# Patient Record
Sex: FEMALE | Race: BLACK OR AFRICAN AMERICAN | Employment: FULL TIME | URBAN - METROPOLITAN AREA
[De-identification: names, ages, dates, MRNs, and addresses within clinical notes are randomized per-mention and may not be internally consistent; named-entity substitution may affect disease eponyms.]

---

## 2023-09-21 ENCOUNTER — APPOINTMENT (OUTPATIENT)
Dept: GENERAL RADIOLOGY | Age: 23
End: 2023-09-21
Attending: EMERGENCY MEDICINE
Payer: COMMERCIAL

## 2023-09-21 ENCOUNTER — HOSPITAL ENCOUNTER (OUTPATIENT)
Age: 23
Discharge: HOME | End: 2023-09-21
Attending: EMERGENCY MEDICINE
Payer: COMMERCIAL

## 2023-09-21 VITALS
DIASTOLIC BLOOD PRESSURE: 83 MMHG | RESPIRATION RATE: 19 BRPM | SYSTOLIC BLOOD PRESSURE: 149 MMHG | HEART RATE: 99 BPM | OXYGEN SATURATION: 100 % | TEMPERATURE: 98 F

## 2023-09-21 DIAGNOSIS — S92.902A CLOSED FRACTURE OF LEFT FOOT, INITIAL ENCOUNTER: Primary | ICD-10-CM

## 2023-09-21 PROCEDURE — 99204 OFFICE O/P NEW MOD 45 MIN: CPT

## 2023-09-21 PROCEDURE — 28470 CLTX METATARSAL FX WO MNP EA: CPT

## 2023-09-21 PROCEDURE — 73610 X-RAY EXAM OF ANKLE: CPT | Performed by: EMERGENCY MEDICINE

## 2023-09-21 PROCEDURE — 73630 X-RAY EXAM OF FOOT: CPT | Performed by: EMERGENCY MEDICINE

## 2023-09-21 NOTE — ED INITIAL ASSESSMENT (HPI)
Patient stepped backwards on a step and felt a crack on Sunday.    Patient with left ankle and left foot pains   Hx of clubbed feet, chronic pain

## 2023-09-21 NOTE — DISCHARGE INSTRUCTIONS
Crutch walking only. Leave splint on until follow up with podiatry in 1-2 days.    Ice, elevate, ibuprofen for pain

## 2023-09-27 ENCOUNTER — OFFICE VISIT (OUTPATIENT)
Facility: LOCATION | Age: 23
End: 2023-09-27

## 2023-09-27 DIAGNOSIS — M79.672 LEFT FOOT PAIN: ICD-10-CM

## 2023-09-27 DIAGNOSIS — Z87.768 HISTORY OF CLUBFOOT: ICD-10-CM

## 2023-09-27 DIAGNOSIS — Q72.899 BRACHYMETATARSIA OF FOURTH METATARSAL BONE: ICD-10-CM

## 2023-09-27 DIAGNOSIS — T14.8XXA CONTUSION OF BONE: Primary | ICD-10-CM

## 2023-09-27 PROCEDURE — 99203 OFFICE O/P NEW LOW 30 MIN: CPT | Performed by: PODIATRIST

## 2023-09-27 NOTE — PROGRESS NOTES
[facility]  Progress Note    Jenn Bausrto is a 24year old female. Patient presents with:  New Patient: Patient was running up the stairs to grab something and missed a step on 9/17/23. Patient seen in  9/21/23, showing two fracture. 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 HPI:     Patient is a very pleasant 16-year-old female is coming to clinic today accompanied by her mother with complaints of a left foot injury. 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  She states that she was running up the stairs to grab something and missed a step on 9/17/2023. She went to the urgent care on 9/21/2023 and was told that she had 2 fractures. She was given a splint and crutches and told to follow-up with us. Currently rates her pain 1-2/10 today. She denies noticing any swelling or bruising to the area. She is denying any other concerns at this time is here for further evaluation and care. Allergies: Patient has no known allergies. No current outpatient medications on file. No past medical history on file. No past surgical history on file. No family history on file. Social History    Socioeconomic History      Marital status: Single          REVIEW OF SYSTEMS:     10 point ROS completed and was negative, except for pertinent positive and negatives stated in subjective. EXAM:   Left lower extremity focused exam:  GENERAL: well developed, well nourished, in no apparent distress  EXTREMITIES:   1. Integument: No open wounds. No color changes. Normal skin temperature and turgor  2. Vascular: Dorsalis pedis 2/4 bilateral and posterior tibial pulses 2/4 bilateral, capillary refill normal.  3. Neurological: Gross sensation intact via light touch bilaterally. Normal sharp/dull sensation   4.  Musculoskeletal: Brachymetatarsia deformity noted to fourth ray, left, varus deformity consistent with history of clubfoot, left. Mild pain to shaft of fourth metatarsal on palpation today. No pain with palpation to remainder of metatarsals of the left foot. ASSESSMENT AND PLAN:   Diagnoses and all orders for this visit:    Contusion of bone    Left foot pain    History of clubfoot    Brachymetatarsia of fourth metatarsal bone        Plan:   -Patient was seen and evaluated today in clinic.    -Independently reviewed patient's previous radiographs. Area of question for fourth metatarsal fracture looks more so overlap from the third metatarsal.    -Discussed both radiographic and clinical findings with patient today. Because her pain is 1/10 today with minimal pain on palpation, I told patient that she most likely sustained a contusion to the bone when falling.    -Patient was provided with a short cam boot today. She will be weightbearing as tolerated to the left lower extremity in her cam boot. Advised to try and put more weight on her heel.    -Recommend staying off her left foot is much as possible, icing, and elevating    -Ace bandage given to patient today.    -Provided patient with a work note today.    -We can also discuss accommodative brace versus orthotics for patient's left foot deformity at next visit.    -The patient indicates understanding of these issues and agrees to the plan. Time spent reviewing pertinent information from patient's chart, reviewing any pertinent imaging, obtaining history and physical exam, and discussing and mutually agreeing on a treatment plan: 35 minutes    RTC 2 weeks for new x-rays    Batsheva Jackson    9/27/2023    Dragon speech recognition software was used to prepare this note. Errors in word recognition may occur. Please contact me with any questions/concerns with this note.

## 2023-10-11 ENCOUNTER — HOSPITAL ENCOUNTER (OUTPATIENT)
Dept: GENERAL RADIOLOGY | Age: 23
Discharge: HOME | End: 2023-10-11
Attending: PODIATRIST
Payer: COMMERCIAL

## 2023-10-11 ENCOUNTER — OFFICE VISIT (OUTPATIENT)
Facility: LOCATION | Age: 23
End: 2023-10-11
Payer: COMMERCIAL

## 2023-10-11 DIAGNOSIS — Q72.899 BRACHYMETATARSIA OF FOURTH METATARSAL BONE: ICD-10-CM

## 2023-10-11 DIAGNOSIS — T14.8XXA CONTUSION OF BONE: Primary | ICD-10-CM

## 2023-10-11 DIAGNOSIS — M79.672 LEFT FOOT PAIN: ICD-10-CM

## 2023-10-11 DIAGNOSIS — R26.81 GAIT INSTABILITY: ICD-10-CM

## 2023-10-11 DIAGNOSIS — T14.8XXA CONTUSION OF BONE: ICD-10-CM

## 2023-10-11 DIAGNOSIS — Z87.768 HISTORY OF CLUBFOOT: ICD-10-CM

## 2023-10-11 PROCEDURE — 99213 OFFICE O/P EST LOW 20 MIN: CPT | Performed by: PODIATRIST

## 2023-10-11 PROCEDURE — 73630 X-RAY EXAM OF FOOT: CPT | Performed by: PODIATRIST

## 2023-10-11 NOTE — PROGRESS NOTES
[facility]  Progress Note    Clotilde Bruno is a 24year old female. Patient presents with: Foot Pain: Patient is here to follow up on left foot pain. She still has some discomfort in the ankle and lateral side of the left foot. She has been non weightbearing using crutches. As attempted to put light pressure on foot, with no pain. Rates pain 2/10, denies any numbness or tingling. Has throbbing at bedtime. HPI:     Patient is a pleasant 48-year-old female who is returning to clinic today accompanied by her mother for recheck of left foot. Patient states that she is doing well overall. She has noticed improvements. She has been in a cam boot. She does have crutches today. She states that the pain is improved, but she is more so nervous with starting to put weight on it. She does relate some discomfort/stiffness in her ankle joint. She does admit to putting some light weight on her foot with no pain. Rates the pain today 2/10. She does relate some throbbing at bedtime, but does admit to wearing her cam boot while she sleeps. Denies any recent injuries or other complaints at this time is here today for further evaluation and care. Denies recent nausea, vomiting, fever, chills. Allergies: Patient has no known allergies. No current outpatient medications on file. No past medical history on file. No past surgical history on file. No family history on file. Social History    Socioeconomic History      Marital status: Single          REVIEW OF SYSTEMS:     10 point ROS completed and was negative, except for pertinent positive and negatives stated in subjective. EXAM:   Left lower extremity focused exam:  GENERAL: well developed, well nourished, in no apparent distress  EXTREMITIES:              1. Integument: No open wounds. No color changes. Normal skin temperature and turgor  2.  Vascular: Dorsalis pedis 2/4 bilateral and posterior tibial pulses 2/4 bilateral, capillary refill normal.  3. Neurological: Gross sensation intact via light touch bilaterally. Normal sharp/dull sensation              4. Musculoskeletal: Brachymetatarsia deformity noted to fourth ray, left, varus deformity consistent with history of clubfoot, left. No pain to shaft of fourth metatarsal on palpation today. No pain with palpation to remainder of metatarsals of the left foot. Decreased active and passive range of motion of left ankle. Some pain with palpation to anterior ankle gutter      ASSESSMENT AND PLAN:   Diagnoses and all orders for this visit:    Contusion of bone  -     XR FOOT WEIGHTBEARING (3 VIEWS), LEFT (CPT=73630); Future    Left foot pain    History of clubfoot    Brachymetatarsia of fourth metatarsal bone    Gait instability        Plan:   -Patient was seen and evaluated today in clinic.    -Updated radiographs were obtained the left foot today independently reviewed, showing no current signs of fractures or other osseous abnormalities other than brachymetatarsia of the fourth metatarsal    -Discussed both radiographic and clinical findings with patient today. Patient is much improved and has no pain on clinical exam today. -Recommend transitioning out of cam boot at this time and into supportive shoe gear. Patient is hesitant and does not want to regress when leaving the cam boot. -I did recommend formal physical therapy to patient today to help assist in transition into supportive shoe gear. Patient is agreeable to this and referral was placed today.      -Provided patient with ankle brace today. She is to be weightbearing as tolerated in supportive shoe gear. I did discuss with patient that while progressing through physical therapy, my goal is for her to lose the ankle brace by the end of therapy.    -Continue resting, icing, and elevating as needed    -The patient indicates understanding of these issues and agrees to the plan.     Time spent reviewing pertinent information from patient's chart, reviewing any pertinent imaging, obtaining history and physical exam, and discussing and mutually agreeing on a treatment plan: 20 minutes    RTC 4 weeks      AUNDREA Hernandez [facility]        10/11/2023    Dragon speech recognition software was used to prepare this note. Errors in word recognition may occur. Please contact me with any questions/concerns with this note.

## 2023-11-09 ENCOUNTER — TELEPHONE (OUTPATIENT)
Facility: LOCATION | Age: 23
End: 2023-11-09

## 2023-11-09 NOTE — TELEPHONE ENCOUNTER
LMTCB    If patient calls back can you ask what [facility] location she would like so that we can fax order

## 2023-11-29 ENCOUNTER — OFFICE VISIT (OUTPATIENT)
Facility: LOCATION | Age: 23
End: 2023-11-29
Payer: COMMERCIAL

## 2023-11-29 DIAGNOSIS — M25.572 CHRONIC ANKLE PAIN, BILATERAL: ICD-10-CM

## 2023-11-29 DIAGNOSIS — R26.81 GAIT INSTABILITY: ICD-10-CM

## 2023-11-29 DIAGNOSIS — M19.272 OTHER SECONDARY OSTEOARTHRITIS OF LEFT ANKLE: Primary | ICD-10-CM

## 2023-11-29 DIAGNOSIS — Z87.768 HISTORY OF CLUBFOOT: ICD-10-CM

## 2023-11-29 DIAGNOSIS — G89.29 CHRONIC ANKLE PAIN, BILATERAL: ICD-10-CM

## 2023-11-29 DIAGNOSIS — M25.571 CHRONIC ANKLE PAIN, BILATERAL: ICD-10-CM

## 2023-11-29 DIAGNOSIS — Q72.899 BRACHYMETATARSIA OF FOURTH METATARSAL BONE: ICD-10-CM

## 2023-11-29 PROCEDURE — 99213 OFFICE O/P EST LOW 20 MIN: CPT | Performed by: PODIATRIST

## 2023-11-29 RX ORDER — MELOXICAM 7.5 MG/1
7.5 TABLET ORAL DAILY
Qty: 30 TABLET | Refills: 0 | Status: SHIPPED | OUTPATIENT
Start: 2023-11-29 | End: 2023-12-29

## 2023-11-29 NOTE — PROGRESS NOTES
[facility]  Progress Note    Bhumi Dc is a 24year old female. Chief Complaint   Patient presents with    Ankle Pain     Left ankle pain, patient has started physical therapy, 4 sessions in, does not notice improvement yet. She is wearing ankle brace and states that she would not be able to work without it. Rates pain 8/10 at the end of the day. She denies any numbness or tingling, fever or chills. HPI:     Patient is a pleasant 59-year-old female who is returning to clinic today accompanied by her mother for recheck of left foot and ankle. Patient states that she continues to do well overall. She does relate continued pain in her left ankle. She does have a history of bilateral clubfoot (left worse than right) with several surgeries to the left lower extremity as a kid. She does relate consistent pain in both ankles, but her left is the worst.  She is in formal physical therapy currently and has had 4 sessions. She unfortunately has not noticed any difference yet. She continues to utilize her lace up ankle brace of the left lower extremity, and a compression ankle sleeve to the right ankle. She does state that she has some relief utilizing the ankle braces. She states the pain can be as worse as 8/10 at the end of her day. Denies recent injuries. No other concerns today and is here for further evaluation and care. Denies recent nausea, vomiting, fever, chills. Allergies: Patient has no known allergies. No current outpatient medications on file. No past medical history on file. No past surgical history on file. No family history on file. Social History     Socioeconomic History    Marital status: Single           REVIEW OF SYSTEMS:     10 point ROS completed and was negative, except for pertinent positive and negatives stated in subjective.        EXAM:     GENERAL: well developed, well nourished, in no apparent distress  EXTREMITIES:              1. Integument: No open wounds. No color changes. Normal skin temperature and turgor  2. Vascular: Dorsalis pedis 2/4 bilateral and posterior tibial pulses 2/4 bilateral, capillary refill normal.  3. Neurological: Gross sensation intact via light touch bilaterally. Normal sharp/dull sensation              4. Musculoskeletal: Brachymetatarsia deformity noted to fourth ray, left, varus deformity consistent with history of clubfoot, left. No pain to shaft of fourth metatarsal on palpation today. No pain with palpation to remainder of metatarsals of the left foot. Decreased active and passive range of motion of bilateral ankles (left worse than right). Pain with palpation to lateral, anterior, and medial ankle gutters of bilateral lower extremities. ASSESSMENT AND PLAN:   Diagnoses and all orders for this visit:    Other secondary osteoarthritis of left ankle  -     DME - EXTERNAL     History of clubfoot  -     DME - EXTERNAL     Brachymetatarsia of fourth metatarsal bone    Gait instability  -     DME - EXTERNAL     Chronic ankle pain, bilateral  -     DME - EXTERNAL         Plan:   -Patient was seen and evaluated today in clinic.    -Patient does have concerns of continued ankle pain to bilateral lower extremities (left worse than right). She has no concerns in regards to her previous left midfoot pain.    -Discussed with patient that the pain that she is having in her ankles is most likely due to degenerative changes within the ankle joint secondary to her clubfoot deformities. Patient does have a history of several lower extremity surgeries to the left lower extremity. Previous radiographs do show concerns of joint space narrowing within the left ankle. -Patient does not have updated radiographs of the right ankle. I will order those today and patient can get them anytime before her next appointment and we will discuss then.    -Patient is currently in formal physical therapy.   I did advise patient continuing to go if she is receiving benefit from going.    -Discussed other options with patient today.    -Patient does get relief utilizing her lace up ankle brace to the left lower extremity, as well as the compression sleeve to the right lower extremity. I did explain to patient that normally long-term ankle brace usage is not good for the soft tissue structures of the ankle. Because patient has minimal range of motion to the left ankle with pain elicited during that range of motion, recommend continued use of ankle brace. Also discussed custom [place] bracing for her left lower extremity. Patient is interested in receiving one.    -Order was placed today for custom [place] brace from [employer] [facility]. -Rx: Meloxicam 7.5 mg 1 p.o. daily    -Briefly discussed with patient and her mother today that if she continues to experience significant pain following conservative management, surgical treatment may be warranted. We will revisit this in the future if patient is not getting relief.    -The patient indicates understanding of these issues and agrees to the plan. Time spent reviewing pertinent information from patient's chart, reviewing any pertinent imaging, obtaining history and physical exam, and discussing and mutually agreeing on a treatment plan: 25 minutes    RTC 6-8 weeks      AUNDREA Gilbert [facility]        11/29/2023    Dragon speech recognition software was used to prepare this note. Errors in word recognition may occur. Please contact me with any questions/concerns with this note.

## 2024-01-26 ENCOUNTER — OFFICE VISIT (OUTPATIENT)
Facility: LOCATION | Age: 24
End: 2024-01-26
Payer: COMMERCIAL

## 2024-01-26 DIAGNOSIS — Z87.768 HISTORY OF CLUBFOOT: ICD-10-CM

## 2024-01-26 DIAGNOSIS — M19.272 OTHER SECONDARY OSTEOARTHRITIS OF LEFT ANKLE: Primary | ICD-10-CM

## 2024-01-26 DIAGNOSIS — Q72.899 BRACHYMETATARSIA OF FOURTH METATARSAL BONE: ICD-10-CM

## 2024-01-26 DIAGNOSIS — R26.81 GAIT INSTABILITY: ICD-10-CM

## 2024-01-26 DIAGNOSIS — M25.572 ACUTE LEFT ANKLE PAIN: ICD-10-CM

## 2024-01-26 PROCEDURE — 99213 OFFICE O/P EST LOW 20 MIN: CPT | Performed by: PODIATRIST

## 2024-01-26 RX ORDER — ELAGOLIX 150 MG/1
150 TABLET, FILM COATED ORAL DAILY
COMMUNITY
Start: 2023-05-30

## 2024-01-26 RX ORDER — MELOXICAM 7.5 MG/1
7.5 TABLET ORAL 2 TIMES DAILY PRN
COMMUNITY

## 2024-01-26 NOTE — PROGRESS NOTES
[facility]  Progress Note    Norma Banks is a 23 year old female.   Chief Complaint   Patient presents with    Ankle Pain     F/u - left ankle - doing better, PT for 2 months, given release from PT, has discomfort/ some intermittent pain, no pain at rest - as day progresses 3-7/10 depending upon amount of activity, denies numbness, tingling         HPI:     Patient is a very pleasant 23-year-old female who is returning to clinic today for follow-up of left foot.  Patient states that she has seen overall improvements in her left foot.  She has been in formal physical therapy for the last 2 months.  She does share that she was released from physical therapy yesterday.  Does believe that she was getting benefit and could benefit from more therapy.  Patient does relates progressive ongoing pain to the left foot, rating the pain anywhere from 3-7/10.  She has been unable to go to  for her custom brace due to scheduling issues.  She is hoping to schedule to go soon.  Denies recent injury or other pedal complaints at this time is here today for further evaluation care.  Denies recent nausea, vomiting, fever, chills.      Allergies: Dander   Current Outpatient Medications   Medication Sig Dispense Refill    Meloxicam 7.5 MG Oral Tab Take 1 tablet (7.5 mg total) by mouth 2 (two) times daily as needed.      ORILISSA 150 MG Oral Tab Take 150 mg by mouth daily.        No past medical history on file.   No past surgical history on file.   No family history on file.   Social History     Socioeconomic History    Marital status: Single           REVIEW OF SYSTEMS:     10 point ROS completed and was negative, except for pertinent positive and negatives stated in subjective.       EXAM:     GENERAL: well developed, well nourished, in no apparent distress  EXTREMITIES:              1. Integument: No open wounds.  No color changes. Normal skin temperature and turgor  2. Vascular: Dorsalis pedis 2/4 bilateral and  posterior tibial pulses 2/4 bilateral, capillary refill normal.  3. Neurological: Gross sensation intact via light touch bilaterally.  Normal sharp/dull sensation              4. Musculoskeletal: Brachymetatarsia deformity noted to fourth ray, left, varus deformity consistent with history of clubfoot, left.  No pain to shaft of fourth metatarsal on palpation today.  No pain with palpation to remainder of metatarsals of the left foot.  Decreased active and passive range of motion of bilateral ankles (left worse than right).  Pain with palpation to lateral, anterior, and medial ankle gutters of bilateral lower extremities.  Some pain with palpation to anterior ankle joints of the left lower extremity.         ASSESSMENT AND PLAN:   Diagnoses and all orders for this visit:    Other secondary osteoarthritis of left ankle    History of clubfoot    Brachymetatarsia of fourth metatarsal bone    Gait instability    Acute left ankle pain        Plan:   -Patient was seen and evaluated today in clinic.    Discussed clinical exam findings.  Patient has overall subjectively and objectively improved in regards to pain.    -Discussed with patient that the pain that she is having in her ankles is most likely due to degenerative changes within the ankle joint secondary to her clubfoot deformities.  Patient does have a history of several lower extremity surgeries to the left lower extremity.  Previous radiographs do show concerns of joint space narrowing within the left ankle.  Patient has not filled her updated left ankle radiographs yet.    -Patient does state that she believes she would benefit from more therapy.  New referral for formal physical therapy was placed today.    -Recommend patient schedule an appointment with  for a custom ankle brace for her left lower extremity.  Recommend patient continue with supportive shoe gear at all times.    -Briefly discussed with patient and her mother today that if she continues to  experience significant pain following conservative management, surgical treatment may be warranted.  We will revisit this in the future if patient is not getting relief.     -The patient indicates understanding of these issues and agrees to the plan.    Time spent reviewing pertinent information from patient's chart, reviewing any pertinent imaging, obtaining history and physical exam, discussing and mutually agreeing on a treatment plan, and documenting encounter: 25 minutes    RTC 4 weeks      Abilio Mccray DPM        1/26/2024    Dragon speech recognition software was used to prepare this note.  Errors in word recognition may occur.  Please contact me with any questions/concerns with this note.

## 2025-01-07 ENCOUNTER — TELEPHONE (OUTPATIENT)
Dept: ORTHOPEDICS CLINIC | Facility: CLINIC | Age: 25
End: 2025-01-07

## 2025-01-07 NOTE — TELEPHONE ENCOUNTER
Patient self scheduled for foot/ankle pain with Dulce, please reschedule with different provider since Dulce does not see for foot

## 2025-01-08 NOTE — TELEPHONE ENCOUNTER
LMOM for pt to schedule appt with dr pineda or don for club foot condition  No future appointments.

## 2025-07-01 ENCOUNTER — HOSPITAL ENCOUNTER (EMERGENCY)
Age: 25
Discharge: HOME | End: 2025-07-01

## 2025-07-01 ENCOUNTER — APPOINTMENT (OUTPATIENT)
Dept: GENERAL RADIOLOGY | Age: 25
End: 2025-07-01

## 2025-07-01 VITALS
DIASTOLIC BLOOD PRESSURE: 72 MMHG | RESPIRATION RATE: 16 BRPM | TEMPERATURE: 98.6 F | HEART RATE: 94 BPM | SYSTOLIC BLOOD PRESSURE: 147 MMHG | OXYGEN SATURATION: 99 %

## 2025-07-01 DIAGNOSIS — Z23 NEED FOR TETANUS BOOSTER: ICD-10-CM

## 2025-07-01 DIAGNOSIS — S80.11XA CONTUSION OF RIGHT LOWER LEG, INITIAL ENCOUNTER: ICD-10-CM

## 2025-07-01 DIAGNOSIS — R03.0 ELEVATED BLOOD PRESSURE READING WITHOUT DIAGNOSIS OF HYPERTENSION: ICD-10-CM
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
## 2025-07-01 DIAGNOSIS — T14.8XXA ABRASION: ICD-10-CM

## 2025-07-01 DIAGNOSIS — S60.012A CONTUSION OF LEFT THUMB WITHOUT DAMAGE TO NAIL, INITIAL ENCOUNTER: ICD-10-CM

## 2025-07-01 DIAGNOSIS — S80.12XA CONTUSION OF LEFT LOWER LEG, INITIAL ENCOUNTER: ICD-10-CM

## 2025-07-01 PROCEDURE — 10002803 HB RX 637: Performed by: NURSE PRACTITIONER

## 2025-07-01 PROCEDURE — 90715 TDAP VACCINE 7 YRS/> IM: CPT | Performed by: NURSE PRACTITIONER

## 2025-07-01 PROCEDURE — 90471 IMMUNIZATION ADMIN: CPT | Performed by: NURSE PRACTITIONER

## 2025-07-01 PROCEDURE — 73130 X-RAY EXAM OF HAND: CPT

## 2025-07-01 PROCEDURE — 99283 EMERGENCY DEPT VISIT LOW MDM: CPT | Performed by: NURSE PRACTITIONER

## 2025-07-01 PROCEDURE — 10002800 HB RX 250 W HCPCS: Performed by: NURSE PRACTITIONER

## 2025-07-01 PROCEDURE — 73590 X-RAY EXAM OF LOWER LEG: CPT

## 2025-07-01 PROCEDURE — 99283 EMERGENCY DEPT VISIT LOW MDM: CPT

## 2025-07-01 RX ORDER — CYCLOBENZAPRINE HCL 10 MG
10 TABLET ORAL ONCE
Status: COMPLETED | OUTPATIENT
Start: 2025-07-01 | End: 2025-07-01

## 2025-07-01 RX ORDER — IBUPROFEN 600 MG/1
600 TABLET, FILM COATED ORAL 3 TIMES DAILY PRN
Qty: 30 TABLET | Refills: 0 | Status: SHIPPED | OUTPATIENT
Start: 2025-07-01

## 2025-07-01 RX ORDER — IBUPROFEN 600 MG/1
600 TABLET, FILM COATED ORAL ONCE
Status: COMPLETED | OUTPATIENT
Start: 2025-07-01 | End: 2025-07-01

## 2025-07-01 RX ORDER — CYCLOBENZAPRINE HCL 10 MG
10 TABLET ORAL 3 TIMES DAILY PRN
Qty: 10 TABLET | Refills: 0 | Status: SHIPPED | OUTPATIENT
Start: 2025-07-01

## 2025-07-01 RX ADMIN — TETANUS TOXOID, REDUCED DIPHTHERIA TOXOID AND ACELLULAR PERTUSSIS VACCINE, ADSORBED 0.5 ML: 5; 2.5; 8; 8; 2.5 SUSPENSION INTRAMUSCULAR at 18:35

## 2025-07-01 RX ADMIN — CYCLOBENZAPRINE HYDROCHLORIDE 10 MG: 10 TABLET, FILM COATED ORAL at 18:35

## 2025-07-01 RX ADMIN — IBUPROFEN 600 MG: 600 TABLET ORAL at 18:35

## 2025-07-01 ASSESSMENT — ENCOUNTER SYMPTOMS
CONFUSION: 0
ACTIVITY CHANGE: 0
LIGHT-HEADEDNESS: 0
SHORTNESS OF BREATH: 0
ABDOMINAL PAIN: 0
BACK PAIN: 0
NAUSEA: 0
HEADACHES: 0
TINGLING: 0
DIZZINESS: 0
CONSTIPATION: 0
DIARRHEA: 0
FATIGUE: 0
LOSS OF CONSCIOUSNESS: 0
COLOR CHANGE: 0
CHILLS: 0
VISUAL CHANGE: 0
NUMBNESS: 0
WEAKNESS: 0
DISORIENTATION: 0
FEVER: 0
WOUND: 0
VOMITING: 0
COUGH: 0

## 2025-07-01 ASSESSMENT — VISUAL ACUITY: OU: 1

## (undated) NOTE — LETTER
9/27/2023          To Whom It May Concern:    Bhumi Dc is currently under my medical care and may return to work at this time. Please excuse Randall for the next 3 weeks. Activity is restricted as follows: Must wear cam boot, please allow her to rest when needed. If you require additional information please contact our office.         Sincerely,    Chiquis Cosme DPM

## (undated) NOTE — LETTER
Date & Time: 9/21/2023, 5:38 PM  Patient: Lane Walkerpool  Encounter Provider(s):    Rica Michaud MD       To Whom It May Concern:    Najma Russ was seen and treated in our department on 9/21/2023. She can return to work with these limitations: Must use crutches, further recommendations per orthopedic physician  .     If you have any questions or concerns, please do not hesitate to call.        __________Mery RN ___________________  Registered Nurse